# Patient Record
Sex: FEMALE | Race: WHITE | NOT HISPANIC OR LATINO | Employment: PART TIME | URBAN - METROPOLITAN AREA
[De-identification: names, ages, dates, MRNs, and addresses within clinical notes are randomized per-mention and may not be internally consistent; named-entity substitution may affect disease eponyms.]

---

## 2023-09-18 NOTE — PROGRESS NOTES
"Subjective   Patient ID: Alta Albarran is a 38 y.o. female who presents for Establish Care.    HPI  Presents today for above reason  Last PCP: Dr Maria, PCP always was cancelling appts pt would make  How did pt find us: google  Last eye exam: unknown  Last dental: 6 months  Last PAP: April 2023 Abnormal cells were found. Was told \"Not to worry about it  Not eating healthy. Including: chicken, fish, fruit, vegetables  No exercise  Sleeping well  Working as:      Pt would like to discuss medication for anxiety  Pt states she was placed on Prozac by GYN for rage during periods.  Pt was using PRN  Would like to discuss taking this daily.  Pt states medication works well  \"I feel human when I take it\"    Pt has chronic RIGHT shoulder pain x 6 years and would like to discuss    Also feeling fatigued more  Ongoing x 7 years    Yearly BW ordered  Flu vaccine declined      Review of Systems  Constitutional:  no chills, no fever and no night sweats.  Eyes: no blurred vision and no eyesight problems.  ENT: no hearing loss, no nasal congestion, no hoarseness and no sore throat.  Neck: no mass (es) and no swelling.  Cardiovascular: no chest pain, no intermittent leg claudication, no lower extremity edema, no palpitation and no syncope.  Respiratory: no cough, no shortness of breath during exertion, no shortness of breath at rest and no wheezing.  Gastrointestinal: no abdominal pain, no blood in stools, no constipation, no diarrhea, no melena, no nausea, no rectal pain and no vomiting.  Genitourinary: no dysuria, no change in urinary frequency, no urinary hesitancy and no feelings of urinary urgency.  Musculoskeletal: no arthralgias, no back pain and no myalgias.  Integumentary: no new skin lesions and no rashes.  Neurological: no difficulty walking, no headache, no limb weakness, no numbness and no tingling.  Psychiatric/Behavioral: no anxiety, no depression, no anhedonia and no substance use " "disorders.  Endocrine: no recent weight gain and no recent weight loss.  Hematologic/Lymphatic: no tendency for easy bruising and no swollen glands    Objective   Physical Exam  Patient to establish care history of asthma diagnosed 2 years ago as needed inhaler use she is got chronic right shoulder pain injured it 5 or 6 years ago playing on the playground with her son felt a pop had immediate pain rested it did not help follow orthopedics they referred her for therapy did that did not help MRI was read as normal I basically told her they could not do anything for its been progressively worsening she continues to do the therapy they taught her and it does not help minimal relief with Advil Tylenol had a steroid that was given for neck pain did not help the shoulder at all.  Exam shows crepitus decreased range of motion elevation and rotation some degree of adhesive capsulitis she has significant pain with elevating the shoulder and elbow and posterior movement.  Physical exam today's office visit constitutional alert and oriented x3.    Head is atraumatic HEENT is within normal limits.    Neck supple no masses full range of motion.    Thyroid is normal in size no thyromegaly there is no carotid bruits.    Pulmonary exam shows clear to auscultation no respiratory distress.    Cardiovascular shows no murmur rub or gallop.  Regular rate and rhythm.    Abdominal exam soft nontender no hepatosplenomegaly or masses normal bowel sounds no rebound no guarding.    Musculoskeletal exam no joint pain no muscle pain full range of motion.    Psych exam normal mood and affect.    Dermatologic exam no skin lesions no rash no blemishes.    Neuro exam is no focal deficits.  Normal exam.    Extremities no edema normal pulses normal capillary refill.    /68   Pulse 71   Temp 36.6 °C (97.9 °F)   Resp 16   Ht 1.619 m (5' 3.75\")   Wt 60.8 kg (134 lb)   SpO2 99%   BMI 23.18 kg/m²     No results found for: \"WBC\", \"HGB\", \"HCT\", " "\"MCV\", \"PLT\"    Assessment/Plan initial MRI done without contrast we need to get a with contrast MRI nothing else is helping helping.  We will get blood drawn she has been taking as needed Prozac for PMS symptoms will go to daily Prozac as she is having more anxiety.  Follow-up in 1 month.  As soon as we have the blood drawn returns we will also call her.  Problem List Items Addressed This Visit    None  Visit Diagnoses       Malaise and fatigue        Asthma, unspecified asthma severity, unspecified whether complicated, unspecified whether persistent              "

## 2023-09-19 ENCOUNTER — OFFICE VISIT (OUTPATIENT)
Dept: PRIMARY CARE | Facility: CLINIC | Age: 38
End: 2023-09-19
Payer: COMMERCIAL

## 2023-09-19 VITALS
WEIGHT: 134 LBS | HEART RATE: 71 BPM | RESPIRATION RATE: 16 BRPM | DIASTOLIC BLOOD PRESSURE: 68 MMHG | BODY MASS INDEX: 22.88 KG/M2 | OXYGEN SATURATION: 99 % | SYSTOLIC BLOOD PRESSURE: 104 MMHG | TEMPERATURE: 97.9 F | HEIGHT: 64 IN

## 2023-09-19 DIAGNOSIS — G89.29 CHRONIC RIGHT SHOULDER PAIN: ICD-10-CM

## 2023-09-19 DIAGNOSIS — R53.81 MALAISE AND FATIGUE: ICD-10-CM

## 2023-09-19 DIAGNOSIS — F41.9 ANXIETY: Primary | ICD-10-CM

## 2023-09-19 DIAGNOSIS — R53.83 MALAISE AND FATIGUE: ICD-10-CM

## 2023-09-19 DIAGNOSIS — J45.909 ASTHMA, UNSPECIFIED ASTHMA SEVERITY, UNSPECIFIED WHETHER COMPLICATED, UNSPECIFIED WHETHER PERSISTENT (HHS-HCC): ICD-10-CM

## 2023-09-19 DIAGNOSIS — M25.511 CHRONIC RIGHT SHOULDER PAIN: ICD-10-CM

## 2023-09-19 PROCEDURE — 1036F TOBACCO NON-USER: CPT | Performed by: FAMILY MEDICINE

## 2023-09-19 PROCEDURE — 99204 OFFICE O/P NEW MOD 45 MIN: CPT | Performed by: FAMILY MEDICINE

## 2023-09-19 RX ORDER — FLUOXETINE HYDROCHLORIDE 20 MG/1
20 CAPSULE ORAL DAILY PRN
Qty: 90 CAPSULE | Refills: 1 | Status: SHIPPED | OUTPATIENT
Start: 2023-09-19 | End: 2024-03-29

## 2023-09-19 RX ORDER — FLUTICASONE PROPIONATE 50 MCG
2 SPRAY, SUSPENSION (ML) NASAL DAILY
COMMUNITY
Start: 2022-05-06

## 2023-09-19 RX ORDER — FLUOXETINE HYDROCHLORIDE 20 MG/1
20 CAPSULE ORAL DAILY PRN
COMMUNITY
Start: 2023-04-27 | End: 2023-09-19 | Stop reason: SDUPTHER

## 2023-09-19 RX ORDER — ALBUTEROL SULFATE 90 UG/1
2 AEROSOL, METERED RESPIRATORY (INHALATION) EVERY 6 HOURS PRN
COMMUNITY
End: 2023-09-19 | Stop reason: SDUPTHER

## 2023-09-19 RX ORDER — ALBUTEROL SULFATE 90 UG/1
2 AEROSOL, METERED RESPIRATORY (INHALATION) EVERY 6 HOURS PRN
Qty: 8.5 G | Refills: 2 | Status: SHIPPED | OUTPATIENT
Start: 2023-09-19

## 2023-09-19 RX ORDER — CEPHALEXIN 250 MG/1
1 CAPSULE ORAL
COMMUNITY
Start: 2023-09-01

## 2023-09-19 RX ORDER — ALBUTEROL SULFATE 90 UG/1
2 AEROSOL, METERED RESPIRATORY (INHALATION) EVERY 6 HOURS PRN
Qty: 18 G | Refills: 2 | Status: SHIPPED | OUTPATIENT
Start: 2023-09-19 | End: 2023-09-19 | Stop reason: SDUPTHER

## 2023-09-19 NOTE — TELEPHONE ENCOUNTER
Gloria from [facility] in [place] is calling in regards to the rx that was sent over for Albuterol 90 mcg. It was sent over as a quantity of 18g but her insurance doesn't cover it that way. It covers it as 8.5g so she wanted to change it  Ok for change?  Please advise      Thanks    [facility] [place] # [phone]

## 2023-10-14 ENCOUNTER — LAB (OUTPATIENT)
Dept: LAB | Facility: LAB | Age: 38
End: 2023-10-14
Payer: COMMERCIAL

## 2023-10-14 DIAGNOSIS — R53.83 MALAISE AND FATIGUE: ICD-10-CM

## 2023-10-14 DIAGNOSIS — R53.81 MALAISE AND FATIGUE: ICD-10-CM

## 2023-10-14 LAB
ALBUMIN SERPL BCP-MCNC: 4.6 G/DL (ref 3.4–5)
ALP SERPL-CCNC: 62 U/L (ref 33–110)
ALT SERPL W P-5'-P-CCNC: 12 U/L (ref 7–45)
ANION GAP SERPL CALC-SCNC: 12 MMOL/L (ref 10–20)
AST SERPL W P-5'-P-CCNC: 16 U/L (ref 9–39)
BASOPHILS # BLD AUTO: 0.01 X10*3/UL (ref 0–0.1)
BASOPHILS NFR BLD AUTO: 0.1 %
BILIRUB SERPL-MCNC: 0.5 MG/DL (ref 0–1.2)
BUN SERPL-MCNC: 13 MG/DL (ref 6–23)
CALCIUM SERPL-MCNC: 9.7 MG/DL (ref 8.6–10.3)
CHLORIDE SERPL-SCNC: 102 MMOL/L (ref 98–107)
CHOLEST SERPL-MCNC: 180 MG/DL (ref 0–199)
CHOLESTEROL/HDL RATIO: 3.2
CO2 SERPL-SCNC: 28 MMOL/L (ref 21–32)
CREAT SERPL-MCNC: 0.72 MG/DL (ref 0.5–1.05)
EOSINOPHIL # BLD AUTO: 0.01 X10*3/UL (ref 0–0.7)
EOSINOPHIL NFR BLD AUTO: 0.1 %
ERYTHROCYTE [DISTWIDTH] IN BLOOD BY AUTOMATED COUNT: 12.5 % (ref 11.5–14.5)
GFR SERPL CREATININE-BSD FRML MDRD: >90 ML/MIN/1.73M*2
GLUCOSE SERPL-MCNC: 85 MG/DL (ref 74–99)
HCT VFR BLD AUTO: 41.4 % (ref 36–46)
HDLC SERPL-MCNC: 56.4 MG/DL
HGB BLD-MCNC: 13.6 G/DL (ref 12–16)
IMM GRANULOCYTES # BLD AUTO: 0.01 X10*3/UL (ref 0–0.7)
IMM GRANULOCYTES NFR BLD AUTO: 0.1 % (ref 0–0.9)
LDLC SERPL CALC-MCNC: 105 MG/DL
LYMPHOCYTES # BLD AUTO: 1.89 X10*3/UL (ref 1.2–4.8)
LYMPHOCYTES NFR BLD AUTO: 26.7 %
MCH RBC QN AUTO: 27.9 PG (ref 26–34)
MCHC RBC AUTO-ENTMCNC: 32.9 G/DL (ref 32–36)
MCV RBC AUTO: 85 FL (ref 80–100)
MONOCYTES # BLD AUTO: 0.42 X10*3/UL (ref 0.1–1)
MONOCYTES NFR BLD AUTO: 5.9 %
NEUTROPHILS # BLD AUTO: 4.74 X10*3/UL (ref 1.2–7.7)
NEUTROPHILS NFR BLD AUTO: 67.1 %
NON HDL CHOLESTEROL: 124 MG/DL (ref 0–149)
NRBC BLD-RTO: 0 /100 WBCS (ref 0–0)
PLATELET # BLD AUTO: 194 X10*3/UL (ref 150–450)
PMV BLD AUTO: 10.1 FL (ref 7.5–11.5)
POTASSIUM SERPL-SCNC: 4 MMOL/L (ref 3.5–5.3)
PROT SERPL-MCNC: 7.2 G/DL (ref 6.4–8.2)
RBC # BLD AUTO: 4.87 X10*6/UL (ref 4–5.2)
SODIUM SERPL-SCNC: 138 MMOL/L (ref 136–145)
T4 FREE SERPL-MCNC: 0.81 NG/DL (ref 0.61–1.12)
TRIGL SERPL-MCNC: 95 MG/DL (ref 0–149)
VLDL: 19 MG/DL (ref 0–40)
WBC # BLD AUTO: 7.1 X10*3/UL (ref 4.4–11.3)

## 2023-10-14 PROCEDURE — 85025 COMPLETE CBC W/AUTO DIFF WBC: CPT

## 2023-10-14 PROCEDURE — 84439 ASSAY OF FREE THYROXINE: CPT

## 2023-10-14 PROCEDURE — 80053 COMPREHEN METABOLIC PANEL: CPT

## 2023-10-14 PROCEDURE — 36415 COLL VENOUS BLD VENIPUNCTURE: CPT

## 2023-10-14 PROCEDURE — 80061 LIPID PANEL: CPT

## 2023-10-20 ENCOUNTER — TELEPHONE (OUTPATIENT)
Dept: PRIMARY CARE | Facility: CLINIC | Age: 38
End: 2023-10-20
Payer: COMMERCIAL

## 2023-10-20 DIAGNOSIS — F41.9 ANXIETY: Primary | ICD-10-CM

## 2023-10-20 RX ORDER — DIAZEPAM 5 MG/1
TABLET ORAL
Qty: 2 TABLET | Refills: 0 | Status: SHIPPED | OUTPATIENT
Start: 2023-10-20

## 2023-10-20 NOTE — TELEPHONE ENCOUNTER
Pt calling, patient is having an MRI with contrast on her shoulder. She is having this done at [facility]. She wanted to know if you could prescribe valium to take the edge off of her anxiety during the test?    Please advise    Pharmacy: [facility]  Last office visit 9/19/23

## 2023-10-26 ENCOUNTER — TELEPHONE (OUTPATIENT)
Dept: PRIMARY CARE | Facility: CLINIC | Age: 38
End: 2023-10-26
Payer: COMMERCIAL

## 2023-10-26 NOTE — TELEPHONE ENCOUNTER
Pt calling, she states that her Mri was not covered. [facility] told her they were waiting on something from us. Lm with  trc    [place] # [phone]

## 2023-10-26 NOTE — TELEPHONE ENCOUNTER
Fax came over for a peer to peer request with the patients insurance. MRI was denied    [employer] # [phone]    Coverage has been denied for the following reasons:    Records do not show x-rays taken after your symptoms started or changed that support further imaging.     A study similar to the one requested has recently been performed. The results of that study showed your Doctor what they needed to see in order to treat your condition. No additional imaging is necessary at this time.     Imaging requires six weeks of provider treatment to be completed. This must have been completed in the past three three months without improved symptoms.

## 2023-10-31 NOTE — TELEPHONE ENCOUNTER
Spoke with the patient she states that she already did the PT. She wanted to know if there was anything else that could be done to fight this process? She has never had the MRI with contrast to see what is causing her pain.

## 2023-11-02 NOTE — TELEPHONE ENCOUNTER
LM on the patients voicemail. The patient would be better off seeing an orthopedic doctor. They would have a better chance at getting any tests approved. Please refer to orthopedics if patient is agreeable.

## 2023-11-09 ENCOUNTER — TELEPHONE (OUTPATIENT)
Dept: PRIMARY CARE | Facility: CLINIC | Age: 38
End: 2023-11-09
Payer: COMMERCIAL

## 2023-11-09 NOTE — TELEPHONE ENCOUNTER
Patient called into office about the denial for her MRI Shoulder. It has been denied but was denied for MRI Shoulder w/o contrast and she is needing an order for MRI Shoulder W/contrast.    Original MRI was also denied because no updated x-ray has been completed. Patient may need to get an x-ray prior to MRI Shoulder w/contrast to get approved.    New authorization has been started via [employer] and submitted to the insurance. Waiting on approval or denial for the MRI w/contrast.

## 2023-11-14 NOTE — TELEPHONE ENCOUNTER
MRI with Contrast has been denied.     Peer to Peer is scheduled for 11/15/2023 @ 7:45 am with Dr. Garcia with [facility]    Case Number: [id number]  Appointment Date: 11/15/2023  Appointment Time: 07:45 AM /Eastern

## 2023-11-15 NOTE — TELEPHONE ENCOUNTER
DR. COLLADO SPOKE TO DR. RICH WITH [employer] AND MRI WITH CONTRAST HAS BEEN APPROVED.    APPROVAL # [id number] VALID FROM 11/15/2023 TO 05/13/2024    CALL AND LMOM FOR PATIENT TO LET HER KNOW THAT MRI W/CONTRAST HAS BEEN APPROVED AND IF SHE WOULD LIKE US TO SCHEDULE.    IF PATIENT WANTS TO SCHEDULE PLEASE GIVE HER THE APPROVAL # WITH VALID DATES.

## 2023-12-15 ENCOUNTER — TELEPHONE (OUTPATIENT)
Dept: PRIMARY CARE | Facility: CLINIC | Age: 38
End: 2023-12-15
Payer: COMMERCIAL

## 2023-12-15 DIAGNOSIS — G89.29 CHRONIC RIGHT SHOULDER PAIN: ICD-10-CM

## 2023-12-15 DIAGNOSIS — M25.511 CHRONIC RIGHT SHOULDER PAIN: ICD-10-CM

## 2023-12-15 NOTE — TELEPHONE ENCOUNTER
----- Message from Mk Rodríguez MD sent at 12/15/2023 11:20 AM EST -----  MRI does not show any tears.  Refer to orthopedics or if already seen by Ortho pain management

## 2023-12-18 NOTE — TELEPHONE ENCOUNTER
Lmom for patient with scheduling info for ortho referral and to call to set up her own appt.  [phone]

## 2024-03-19 ENCOUNTER — TELEPHONE (OUTPATIENT)
Dept: PRIMARY CARE | Facility: CLINIC | Age: 39
End: 2024-03-19
Payer: COMMERCIAL

## 2024-03-19 NOTE — TELEPHONE ENCOUNTER
Patient is calling - she is asking for a letter from us, for her massages that she gets, needs to say that she gets them for medical purposes for her chronic neck & shoulder pain.  Ok for letter?    [phone]

## 2024-03-19 NOTE — LETTER
March 19, 2024     Patient: Alta Albarran   YOB: 1985     To Whom It May Concern:    Alta Albarran is a current patient of mine. It is medically necessary that she get massages for her chronic neck and shoulder pain.     If you have any questions or concerns, please don't hesitate to call.         Sincerely,         Mk Rodríguez MD

## 2024-03-28 DIAGNOSIS — F41.9 ANXIETY: ICD-10-CM

## 2024-03-29 RX ORDER — FLUOXETINE HYDROCHLORIDE 20 MG/1
20 CAPSULE ORAL DAILY PRN
Qty: 30 CAPSULE | Refills: 0 | Status: SHIPPED | OUTPATIENT
Start: 2024-03-29 | End: 2024-05-17 | Stop reason: SDUPTHER

## 2024-05-17 DIAGNOSIS — F41.9 ANXIETY: ICD-10-CM

## 2024-05-17 RX ORDER — FLUOXETINE HYDROCHLORIDE 20 MG/1
20 CAPSULE ORAL DAILY
Qty: 30 CAPSULE | Refills: 0 | Status: SHIPPED | OUTPATIENT
Start: 2024-05-17 | End: 2024-05-28 | Stop reason: SDUPTHER

## 2024-05-17 NOTE — TELEPHONE ENCOUNTER
Rx Refill Request Telephone Encounter    Name:  Alta Albarran  :  [id number]  Medication Name:  FLUoxetine (PROzac) 20 mg capsule     Specific Pharmacy location:  [facility]   Date of last appointment:  2023  Date of next appointment:  2024  Best number to reach patient:  [phone]

## 2024-05-23 NOTE — PROGRESS NOTES
Subjective   Patient ID: Alta Albarran is a 38 y.o. female who presents for Anxiety and Palpitations.  HPI    Patient presents today for a follow-up on Anxiety. Is taking Prozac 20 MG. States she has no issues with this medication. Patient states she is doing well on this medication.     Patient states she has been having frequent heart palpitations daily. Patient states this has been going on for a long time. Patient states she has seen a cardiologist in the past and he was not concerned about this. Patient states she does not have chest pain or dizziness.    Patient would like it noted she is having a hysterectomy at the end of July.    Has no other new problem /question.    Review of Systems  Constitutional:  no chills, no fever and no night sweats.  Eyes: no blurred vision and no eyesight problems.  ENT: no hearing loss, no nasal congestion, no hoarseness and no sore throat.  Neck: no mass (es) and no swelling.  Cardiovascular: no chest pain, no intermittent leg claudication, no lower extremity edema, no palpitation and no syncope.  Respiratory: no cough, no shortness of breath during exertion, no shortness of breath at rest and no wheezing.  Gastrointestinal: no abdominal pain, no blood in stools, no constipation, no diarrhea, no melena, no nausea, no rectal pain and no vomiting.  Genitourinary: no dysuria, no change in urinary frequency, no urinary hesitancy and no feelings of urinary urgency.  Musculoskeletal: no arthralgias, no back pain and no myalgias.  Integumentary: no new skin lesions and no rashes.  Neurological: no difficulty walking, no headache, no limb weakness, no numbness and no tingling.  Psychiatric/Behavioral: no anxiety, no depression, no anhedonia and no substance use disorders.  Endocrine: no recent weight gain and no recent weight loss.  Hematologic/Lymphatic: no tendency for easy bruising and no swollen glands    Objective   Physical Exam  Patient in for complaints of palpitations has  "had them on and off for years but over the past 3 to 4 months it has been happening at least 2-3 times a day every day denies being under increased stress she drinks no caffeinated beverages.  Denies dyspnea on exertion or chest pain.  Denies heart racing.  She has a strong family history of A-fib in mom and grandmother and father side of the family has a significant cardiac history of early onset MI.  Thin female in no acute distress.  EKG normal sinus no acute changes she has no complaints of chest pain or shortness of breath.  No physical complaints today.  /62 (BP Location: Left arm, Patient Position: Sitting)   Pulse 65   Temp 36.2 °C (97.1 °F) (Temporal)   Ht 1.619 m (5' 3.75\")   Wt 58.3 kg (128 lb 9.6 oz)   SpO2 100%   BMI 22.25 kg/m²     Lab Results   Component Value Date    WBC 7.1 10/14/2023    HGB 13.6 10/14/2023    HCT 41.4 10/14/2023    MCV 85 10/14/2023     10/14/2023       Assessment/Plan given her family history and her concern we will refer her to cardiology for evaluation as a precaution.  Will get thyroid levels checked also.  Having a hysterectomy later in the summer.  Follow-up when we get the blood test results.  Await cardiology's evaluation and recommendation.  Problem List Items Addressed This Visit    None  Visit Diagnoses       Anxiety    -  Primary    Asthma, unspecified asthma severity, unspecified whether complicated, unspecified whether persistent ([facility])        BMI 22.0-22.9, adult        Heart palpitations        Relevant Orders    ECG 12 lead (Clinic Performed)            "

## 2024-05-28 ENCOUNTER — OFFICE VISIT (OUTPATIENT)
Dept: PRIMARY CARE | Facility: CLINIC | Age: 39
End: 2024-05-28
Payer: COMMERCIAL

## 2024-05-28 VITALS
DIASTOLIC BLOOD PRESSURE: 62 MMHG | HEART RATE: 65 BPM | HEIGHT: 64 IN | WEIGHT: 128.6 LBS | OXYGEN SATURATION: 100 % | BODY MASS INDEX: 21.95 KG/M2 | SYSTOLIC BLOOD PRESSURE: 102 MMHG | TEMPERATURE: 97.1 F

## 2024-05-28 DIAGNOSIS — J45.909 ASTHMA, UNSPECIFIED ASTHMA SEVERITY, UNSPECIFIED WHETHER COMPLICATED, UNSPECIFIED WHETHER PERSISTENT (HHS-HCC): ICD-10-CM

## 2024-05-28 DIAGNOSIS — F41.9 ANXIETY: Primary | ICD-10-CM

## 2024-05-28 DIAGNOSIS — R00.2 HEART PALPITATIONS: ICD-10-CM

## 2024-05-28 PROCEDURE — 99213 OFFICE O/P EST LOW 20 MIN: CPT | Performed by: FAMILY MEDICINE

## 2024-05-28 PROCEDURE — 1036F TOBACCO NON-USER: CPT | Performed by: FAMILY MEDICINE

## 2024-05-28 PROCEDURE — 93000 ELECTROCARDIOGRAM COMPLETE: CPT | Performed by: FAMILY MEDICINE

## 2024-05-28 PROCEDURE — 3008F BODY MASS INDEX DOCD: CPT | Performed by: FAMILY MEDICINE

## 2024-05-28 RX ORDER — FLUOXETINE HYDROCHLORIDE 20 MG/1
20 CAPSULE ORAL DAILY
Qty: 90 CAPSULE | Refills: 1 | Status: SHIPPED | OUTPATIENT
Start: 2024-05-28 | End: 2024-08-26

## 2024-05-28 ASSESSMENT — PATIENT HEALTH QUESTIONNAIRE - PHQ9
SUM OF ALL RESPONSES TO PHQ9 QUESTIONS 1 AND 2: 0
2. FEELING DOWN, DEPRESSED OR HOPELESS: NOT AT ALL
1. LITTLE INTEREST OR PLEASURE IN DOING THINGS: NOT AT ALL

## 2024-07-12 ENCOUNTER — APPOINTMENT (OUTPATIENT)
Dept: CARDIOLOGY | Facility: CLINIC | Age: 39
End: 2024-07-12
Payer: COMMERCIAL

## 2024-07-12 VITALS
SYSTOLIC BLOOD PRESSURE: 110 MMHG | BODY MASS INDEX: 23.37 KG/M2 | HEIGHT: 62 IN | HEART RATE: 79 BPM | WEIGHT: 127 LBS | DIASTOLIC BLOOD PRESSURE: 64 MMHG

## 2024-07-12 DIAGNOSIS — Z01.818 PRE-OP EXAMINATION: ICD-10-CM

## 2024-07-12 DIAGNOSIS — R00.2 HEART PALPITATIONS: ICD-10-CM

## 2024-07-12 DIAGNOSIS — R94.31 ABNORMAL EKG: ICD-10-CM

## 2024-07-12 DIAGNOSIS — R07.89 CHEST PRESSURE: ICD-10-CM

## 2024-07-12 PROCEDURE — 1036F TOBACCO NON-USER: CPT | Performed by: INTERNAL MEDICINE

## 2024-07-12 PROCEDURE — 3008F BODY MASS INDEX DOCD: CPT | Performed by: INTERNAL MEDICINE

## 2024-07-12 PROCEDURE — 99204 OFFICE O/P NEW MOD 45 MIN: CPT | Performed by: INTERNAL MEDICINE

## 2024-07-12 NOTE — PROGRESS NOTES
CARDIOLOGY NEW PATIENT OFFICE VISIT    Patient:  Alta Albarran  YOB: 1985  MRN: [id number]       Chief Complaint:      Chief Complaint   Patient presents with    New Patient Visit       History of Present Illness:     Alta Albarran is a 39 y.o. female who is being seen today at the request of Dr. Mk Velasquez for evaluation of palpitations.  She does not have any history of atherosclerotic heart or valvular heart disease.  She has a history of asthma for which she uses inhalers.  She has a history of anxiety for which she takes Prozac 20 mg daily.  She works full-time as a .  She notes longstanding history of palpitations.  She notes the symptoms have been occurring more frequently over the past few months.  She describes varying types of palpitations including heart, brief pressure-like discomfort in the chest, or brief flutters.  She sometimes feels a hard pinching like sensation followed by some coughing.  She states the symptoms can occur at rest or with exertional activity.  She is not able to relate any exacerbating or relieving factors.  She drinks minimal if any caffeine.  She does not drink any alcohol.  She works as a .  She generally is very active without significant limitations.   She reports a strong family history of atrial fibrillation including her mother and maternal grandmother.  She is scheduled to undergo a hysterectomy in 2 1/2 weeks.     Her EKG in the office today shows normal sinus rhythm, poor R wave progression, and inferior nonspecific ST-T wave changes.  Lab studies dated July 9, 2024 showed a normal CBC and CMP.  TSH and free T4 were normal.  Lipid profile October 14, 2023 showed a total cholesterol 180 with HDL 56, , and triglycerides 95.  Other details as noted below.      Allergies:     Allergies   Allergen Reactions    Cephalosporins Other        Outpatient Medications:     Current Outpatient Medications   Medication Instructions     Advair Diskus 100-50 mcg/dose diskus inhaler 1 puff, inhalation    albuterol 90 mcg/actuation inhaler 2 puffs, inhalation, Every 6 hours PRN    diazePAM (Valium) 5 mg tablet Take 1 tablet 2 hours prior to MRI.  Repeat 30 minutes before MRI if needed    FLUoxetine (PROZAC) 20 mg, oral, Daily    fluticasone (Flonase) 50 mcg/actuation nasal spray 2 sprays, nasal, Daily        Past Medical History:     Past Medical History:   Diagnosis Date    Asthma ([facility])     Personal history of other diseases of the digestive system     History of inflammatory bowel disease       Social History:     Social History     Tobacco Use    Smoking status: Never    Smokeless tobacco: Never   Vaping Use    Vaping status: Never Used   Substance Use Topics    Alcohol use: Never    Drug use: Never       Family History:     Family History   Problem Relation Name Age of Onset    Other (htn) Mother      Hyperlipidemia Mother      Other (htn) Father      Hyperlipidemia Father         Review of Systems:     12 point review of systems completed and is negative other than that detailed above.       Objective:     Vitals:    07/12/24 1501   BP: 110/64   Pulse: 79       Wt Readings from Last 4 Encounters:   07/12/24 57.6 kg (127 lb)   05/28/24 58.3 kg (128 lb 9.6 oz)   09/19/23 60.8 kg (134 lb)   05/06/22 56.2 kg (124 lb)       Physical Examination:   GENERAL:  Well developed, well nourished, in no acute distress.  CHEST:  Symmetric and nontender.  NEURO/PSYCH:  Alert and oriented times three with approppriate behavior and responses.  NECK:  Supple, no JVD, no bruit.  LUNGS:  Clear to auscultation bilaterally, normal respiratory effort.  HEART:  Rate and rhythm regular with no evident murmur, no gallop appreciated.        There are no rubs, clicks or heaves.  EXTREMITIES:  Warm with good color, no clubbing or cyanosis.  There is no edema noted.  PERIPHERAL VASCULAR:  Pulses present and equally palpable; 2+ throughout.      Lab:     CBC:   Lab Results    Component Value Date    WBC 7.1 10/14/2023    RBC 4.87 10/14/2023    HGB 13.6 10/14/2023    HCT 41.4 10/14/2023     10/14/2023        CMP:    Lab Results   Component Value Date     10/14/2023    K 4.0 10/14/2023     10/14/2023    CO2 28 10/14/2023    BUN 13 10/14/2023    CREATININE 0.72 10/14/2023    GLUCOSE 85 10/14/2023    CALCIUM 9.7 10/14/2023       Lipid Profile:    Lab Results   Component Value Date    TRIG 95 10/14/2023    HDL 56.4 10/14/2023    LDLCALC 105 (H) 10/14/2023       BMP:  Lab Results   Component Value Date     10/14/2023    K 4.0 10/14/2023     10/14/2023    CO2 28 10/14/2023    BUN 13 10/14/2023    CREATININE 0.72 10/14/2023       CBC:  Lab Results   Component Value Date    WBC 7.1 10/14/2023    RBC 4.87 10/14/2023    HGB 13.6 10/14/2023    HCT 41.4 10/14/2023    MCV 85 10/14/2023    MCH 27.9 10/14/2023    MCHC 32.9 10/14/2023    RDW 12.5 10/14/2023     10/14/2023    MPV 10.1 10/14/2023       Hepatic Function Panel:    Lab Results   Component Value Date    ALKPHOS 62 10/14/2023    ALT 12 10/14/2023    AST 16 10/14/2023    PROT 7.2 10/14/2023    BILITOT 0.5 10/14/2023       Assessment:     Problem List Items Addressed This Visit             ICD-10-CM    Heart palpitations R00.2    Relevant Orders    Follow Up In Cardiology    Holter Or Event Cardiac Monitor    Echocardiogram Stress Test    Chest pressure R07.89    Relevant Orders    Follow Up In Cardiology    Holter Or Event Cardiac Monitor    Echocardiogram Stress Test    Pre-op examination Z01.818    Relevant Orders    Follow Up In Cardiology    Holter Or Event Cardiac Monitor    Echocardiogram Stress Test    Abnormal EKG R94.31    Relevant Orders    Follow Up In Cardiology    Holter Or Event Cardiac Monitor    Echocardiogram Stress Test       Plan:     In light of her palpitations she will be scheduled for a 7-day Zio patch monitor in an effort to better correlate her symptoms with significant  dysrhythmia.    Her resting EKG is abnormal and shows poor R wave progression.  Cannot exclude a septal infarct.  There are some inferior nonspecific ST-T wave changes.  She is scheduled to undergo a hysterectomy in 2 and half weeks.  In light of her symptoms of chest discomfort she will be scheduled for a stress echocardiogram in the near future.  Further recommendations pending these results.

## 2024-07-12 NOTE — PATIENT INSTRUCTIONS
Continue same medications and treatments.     Please bring any lab results from other providers / physicians to your next appointment.     Please bring all medicines, vitamins, and herbal supplements with you when you come to the office.     Prescriptions will not be filled unless you are compliant with your follow up appointments or have a follow up appointment scheduled as per instruction of your physician. Refills should be requested at the time of your visit.

## 2024-07-15 ENCOUNTER — TELEPHONE (OUTPATIENT)
Dept: CARDIOLOGY | Facility: CLINIC | Age: 39
End: 2024-07-15

## 2024-07-15 ENCOUNTER — APPOINTMENT (OUTPATIENT)
Dept: CARDIOLOGY | Facility: CLINIC | Age: 39
End: 2024-07-15
Payer: COMMERCIAL

## 2024-07-15 DIAGNOSIS — Z01.818 PRE-OP EXAMINATION: ICD-10-CM

## 2024-07-15 DIAGNOSIS — R94.31 ABNORMAL EKG: ICD-10-CM

## 2024-07-15 DIAGNOSIS — R07.89 CHEST PRESSURE: ICD-10-CM

## 2024-07-15 DIAGNOSIS — R00.2 HEART PALPITATIONS: ICD-10-CM

## 2024-07-15 PROCEDURE — 93272 ECG/REVIEW INTERPRET ONLY: CPT | Performed by: INTERNAL MEDICINE

## 2024-07-15 NOTE — TELEPHONE ENCOUNTER
7 day Faye [id number] no prior auth required per the [employer] Transaction ID: [id number].

## 2024-07-16 ENCOUNTER — TELEPHONE (OUTPATIENT)
Dept: CARDIOLOGY | Facility: CLINIC | Age: 39
End: 2024-07-16
Payer: COMMERCIAL

## 2024-07-16 NOTE — TELEPHONE ENCOUNTER
Armand nurse at [facility] Pre Admission Testing  #[phone] left [facility] stating pt is was seen by Dr. Watts last week and is scheduled for hysterectomy on 7/30/24 and need any testing that has been done recently and faxed to #[phone].    Routed to Medical records to fax over requested info including last office note from Dr. Watts, most recent EKG and any recent testing.

## 2024-07-18 ENCOUNTER — TELEPHONE (OUTPATIENT)
Dept: CARDIOLOGY | Facility: CLINIC | Age: 39
End: 2024-07-18
Payer: COMMERCIAL

## 2024-07-18 DIAGNOSIS — R00.2 HEART PALPITATIONS: ICD-10-CM

## 2024-07-18 DIAGNOSIS — R07.89 CHEST PRESSURE: ICD-10-CM

## 2024-07-18 DIAGNOSIS — R94.31 ABNORMAL EKG: ICD-10-CM

## 2024-07-18 DIAGNOSIS — Z01.818 PRE-OP EXAMINATION: ICD-10-CM

## 2024-07-18 NOTE — TELEPHONE ENCOUNTER
Stress Echo is pending a medical review with [employer]. [employer] requested clinical information and that has been sent.   Will continue to follow for any updates.

## 2024-07-18 NOTE — TELEPHONE ENCOUNTER
Pt called office stating stress echo is denied. Pt is scheduled for 7/24/24.    Pt says that she got a call from her insurance stating it is not covered, but did not say why and was advised a letter will be mailed.     Routed to Kiesha Basurto for follow up. Pt requesting a return call.   Routed to Dr. Watts for any new orders or recommendations.

## 2024-07-19 NOTE — TELEPHONE ENCOUNTER
Called and LVM with patient advising that it was denied as of last night. Will call once we know what the next steps are.

## 2024-07-19 NOTE — TELEPHONE ENCOUNTER
Received another update from pre-cert this morning.    [employer] has denied coverage for the stress echo, but no denial letter has been uploaded yet.

## 2024-07-19 NOTE — TELEPHONE ENCOUNTER
Called to Armand, Nurse at [facility] PAT and advised records have been faxed. Armand states records rec'd and waiting further testing to be done and will wait results and fax.

## 2024-07-23 NOTE — TELEPHONE ENCOUNTER
Advised patient of message and verbalized understanding. Order placed pending authorization. Patient to have stress test completed tomorrow morning.  Erin Da Silva MA

## 2024-07-24 ENCOUNTER — CLINICAL SUPPORT (OUTPATIENT)
Dept: CARDIOLOGY | Facility: HOSPITAL | Age: 39
End: 2024-07-24
Payer: COMMERCIAL

## 2024-07-24 ENCOUNTER — APPOINTMENT (OUTPATIENT)
Dept: CARDIOLOGY | Facility: HOSPITAL | Age: 39
End: 2024-07-24
Payer: COMMERCIAL

## 2024-07-24 DIAGNOSIS — Z01.818 PRE-OP EXAMINATION: ICD-10-CM

## 2024-07-24 DIAGNOSIS — R00.2 HEART PALPITATIONS: ICD-10-CM

## 2024-07-24 DIAGNOSIS — R07.89 CHEST PRESSURE: ICD-10-CM

## 2024-07-24 DIAGNOSIS — R94.31 ABNORMAL EKG: ICD-10-CM

## 2024-07-24 PROCEDURE — 93017 CV STRESS TEST TRACING ONLY: CPT

## 2024-07-24 PROCEDURE — 93016 CV STRESS TEST SUPVJ ONLY: CPT | Performed by: INTERNAL MEDICINE

## 2024-07-24 PROCEDURE — 93018 CV STRESS TEST I&R ONLY: CPT | Performed by: INTERNAL MEDICINE

## 2024-08-23 ENCOUNTER — APPOINTMENT (OUTPATIENT)
Dept: CARDIOLOGY | Facility: CLINIC | Age: 39
End: 2024-08-23
Payer: COMMERCIAL

## 2024-08-23 VITALS
BODY MASS INDEX: 23.45 KG/M2 | DIASTOLIC BLOOD PRESSURE: 60 MMHG | HEIGHT: 62 IN | WEIGHT: 127.4 LBS | HEART RATE: 83 BPM | SYSTOLIC BLOOD PRESSURE: 100 MMHG

## 2024-08-23 DIAGNOSIS — Z01.818 PRE-OP EXAMINATION: ICD-10-CM

## 2024-08-23 DIAGNOSIS — R00.2 HEART PALPITATIONS: ICD-10-CM

## 2024-08-23 DIAGNOSIS — R94.31 ABNORMAL EKG: ICD-10-CM

## 2024-08-23 DIAGNOSIS — R07.89 CHEST PRESSURE: ICD-10-CM

## 2024-08-23 PROCEDURE — 3008F BODY MASS INDEX DOCD: CPT | Performed by: INTERNAL MEDICINE

## 2024-08-23 PROCEDURE — 99213 OFFICE O/P EST LOW 20 MIN: CPT | Performed by: INTERNAL MEDICINE

## 2024-08-23 PROCEDURE — 1036F TOBACCO NON-USER: CPT | Performed by: INTERNAL MEDICINE

## 2024-08-23 NOTE — PROGRESS NOTES
Patient:  Alta Albarran  YOB: 1985  MRN: [id number]       HPI:       Alta Albarran is a 39 y.o. female who returns today for cardiac follow-up.  She was seen July 12, 2024 for evaluation of palpitations.  She does not have any history of atherosclerotic heart or valvular heart disease.  She has a history of asthma for which she uses inhalers.  She has a history of anxiety for which she takes Prozac 20 mg daily.  She works full-time as a .  She notes a longstanding history of palpitations.  She notes the symptoms have been occurring more frequently over the past few months.  She describes varying types of palpitations including hard, brief pressure-like discomfort in the chest and brief flutters.  She sometimes feels a hard pinching like sensation followed by some coughing.  She states the symptoms can occur at rest or with exertional activity.  She is not able to relate any exacerbating or relieving factors.  She drinks minimal if any caffeine.  She does not drink any alcohol.  She generally is very active without significant limitations.   She reports a strong family history of atrial fibrillation including her mother and maternal grandmother.  She is scheduled to undergo a hysterectomy in 2 1/2 weeks.      Her EKG showed normal sinus rhythm, poor R wave progression, and inferior nonspecific ST-T wave changes.  Lab studies dated July 9, 2024 showed a normal CBC and CMP.  TSH and free T4 were normal.  Lipid profile October 14, 2023 showed a total cholesterol 180 with HDL 56, , and triglycerides 95.     An exercise treadmill test on July 24, 2024 was negative for any exercise-induced ST changes.  She described level 1 out of 10 discomfort.  She achieved 99% of MPHR at an energy level of 14.30 METS.  No cardiac dysrhythmias.  Normal blood pressure response.  A 7-day Zio patch monitor July 15, 2024 showed normal sinus rhythm with an average heart rate 76 bpm.  Heart rates  ranged from 45 to 154 bpm.  Rare PACs.  No atrial or ventricular dysrhythmias.    She has been doing well since her last visit.  She is recovering from her hysterectomy.  She plans to return to work in about 3 weeks.  She has rare brief palpitations.  She denies any recent chest pain or shortness breath.  She denies any orthopnea, PND, or increasing peripheral edema.  She denies any lightheadedness, near-syncope, or syncope.  She denies any fever, chills, or cough.  She denies any nausea, vomiting, or diaphoresis.  She denies any hemoptysis, hematemesis, melena, or hematochezia.  I discussed results of testing in detail with her.  Overall low risk findings.  She does not have any anginal or CHF symptoms.  Her blood pressures are well-controlled.  I advised her to consider using a E Ink mobile device or smart watch to better document the actual heart rhythm when she has palpitations.  Other details as noted below.      Objective:     Vitals:    08/23/24 1455   BP: 100/60   Pulse: 83       Wt Readings from Last 4 Encounters:   08/23/24 57.8 kg (127 lb 6.4 oz)   07/12/24 57.6 kg (127 lb)   05/28/24 58.3 kg (128 lb 9.6 oz)   09/19/23 60.8 kg (134 lb)       Allergies:     Allergies   Allergen Reactions    Cephalosporins Other          Medications:     Current Outpatient Medications   Medication Instructions    diazePAM (Valium) 5 mg tablet Take 1 tablet 2 hours prior to MRI.  Repeat 30 minutes before MRI if needed    FLUoxetine (PROZAC) 20 mg, oral, Daily       Physical Examination:   GENERAL:  Well developed, well nourished, in no acute distress.  CHEST:  Symmetric and nontender.  NEURO/PSYCH:  Alert and oriented times three with approppriate behavior and responses.  NECK:  Supple, no JVD, no bruit.  LUNGS:  Clear to auscultation bilaterally, normal respiratory effort.  HEART:  Rate and rhythm regular with no evident murmur, no gallop appreciated.        There are no rubs, clicks or heaves.  EXTREMITIES:  Warm with  good color, no clubbing or cyanosis.  There is no edema noted.  PERIPHERAL VASCULAR:  Pulses present and equally palpable; 2+ throughout.      Lab:     CBC:   Lab Results   Component Value Date    WBC 7.1 10/14/2023    RBC 4.87 10/14/2023    HGB 13.6 10/14/2023    HCT 41.4 10/14/2023     10/14/2023        CMP:    Lab Results   Component Value Date     10/14/2023    K 4.0 10/14/2023     10/14/2023    CO2 28 10/14/2023    BUN 13 10/14/2023    CREATININE 0.72 10/14/2023    GLUCOSE 85 10/14/2023    CALCIUM 9.7 10/14/2023       Lipid Profile:    Lab Results   Component Value Date    TRIG 95 10/14/2023    HDL 56.4 10/14/2023    LDLCALC 105 (H) 10/14/2023       BMP:  Lab Results   Component Value Date     10/14/2023    K 4.0 10/14/2023     10/14/2023    CO2 28 10/14/2023    BUN 13 10/14/2023    CREATININE 0.72 10/14/2023       CBC:  Lab Results   Component Value Date    WBC 7.1 10/14/2023    RBC 4.87 10/14/2023    HGB 13.6 10/14/2023    HCT 41.4 10/14/2023    MCV 85 10/14/2023    MCH 27.9 10/14/2023    MCHC 32.9 10/14/2023    RDW 12.5 10/14/2023     10/14/2023    MPV 10.1 10/14/2023       Hepatic Function Panel:    Lab Results   Component Value Date    ALKPHOS 62 10/14/2023    ALT 12 10/14/2023    AST 16 10/14/2023    PROT 7.2 10/14/2023    BILITOT 0.5 10/14/2023       Problem List:     Patient Active Problem List   Diagnosis    Heart palpitations    Chest pressure    Pre-op examination    Abnormal EKG       Asessment:     Problem List Items Addressed This Visit             ICD-10-CM    Heart palpitations R00.2    Relevant Orders    Follow Up In Cardiology    Chest pressure R07.89    Pre-op examination Z01.818    Abnormal EKG R94.31

## 2024-08-29 ENCOUNTER — OFFICE VISIT (OUTPATIENT)
Dept: PRIMARY CARE | Facility: CLINIC | Age: 39
End: 2024-08-29
Payer: COMMERCIAL

## 2024-08-29 VITALS
TEMPERATURE: 97 F | SYSTOLIC BLOOD PRESSURE: 98 MMHG | HEIGHT: 62 IN | OXYGEN SATURATION: 98 % | BODY MASS INDEX: 23.6 KG/M2 | WEIGHT: 128.22 LBS | DIASTOLIC BLOOD PRESSURE: 62 MMHG | RESPIRATION RATE: 16 BRPM | HEART RATE: 80 BPM

## 2024-08-29 DIAGNOSIS — K21.9 GASTROESOPHAGEAL REFLUX DISEASE, UNSPECIFIED WHETHER ESOPHAGITIS PRESENT: ICD-10-CM

## 2024-08-29 DIAGNOSIS — I80.8 THROMBOPHLEBITIS ARM: Primary | ICD-10-CM

## 2024-08-29 PROBLEM — D68.59 THROMBOPHILIA (MULTI): Status: ACTIVE | Noted: 2024-08-29

## 2024-08-29 PROCEDURE — 3008F BODY MASS INDEX DOCD: CPT | Performed by: FAMILY MEDICINE

## 2024-08-29 PROCEDURE — 99213 OFFICE O/P EST LOW 20 MIN: CPT | Performed by: FAMILY MEDICINE

## 2024-08-29 PROCEDURE — 1036F TOBACCO NON-USER: CPT | Performed by: FAMILY MEDICINE

## 2024-08-29 RX ORDER — OMEPRAZOLE 40 MG/1
40 CAPSULE, DELAYED RELEASE ORAL
Qty: 30 CAPSULE | Refills: 2 | Status: SHIPPED | OUTPATIENT
Start: 2024-08-29

## 2024-08-29 RX ORDER — NABUMETONE 500 MG/1
500 TABLET, FILM COATED ORAL 2 TIMES DAILY
Qty: 60 TABLET | Refills: 1 | Status: SHIPPED | OUTPATIENT
Start: 2024-08-29

## 2024-08-29 ASSESSMENT — PATIENT HEALTH QUESTIONNAIRE - PHQ9
2. FEELING DOWN, DEPRESSED OR HOPELESS: NOT AT ALL
1. LITTLE INTEREST OR PLEASURE IN DOING THINGS: NOT AT ALL
SUM OF ALL RESPONSES TO PHQ9 QUESTIONS 1 AND 2: 0

## 2024-08-29 NOTE — PROGRESS NOTES
"Subjective   Patient ID: Alta Albarran is a 39 y.o. female who presents for thrombophlebitis    HPI    Patient had a hysterectomy on 7/30/24. Patient presents today for blood clots in right arm. Ongoing since 8/5/24. She states that develop another blood clot on Tuesday 8/27/24. Pt states that it hurts to tough the area  where blood clots are located on the right arm. Pt has tried warm compass and massage minimal relief.  History of thrombophlebitis. Patient did follow up with her OB/GYN. Patient admits that it was recommended to see her PCP.     No other concern /question   Review of systems  ; Patient seen today for exam denies any problems with headaches or vision, denies any shortness of breath chest pain nausea or vomiting, no black stool no blood in the stool no heartburn type symptoms denies any problems with constipation or diarrhea, and no dysuria-type symptoms    The patient's allergies medications were reviewed with them today    The patient's social family and surgical history or also reviewed here today, along with her past medical history.     Objective     Alert and active in  no acute distress  Neurological exam unremarkable- DTRs in upper and lower extremities within normal limits.   skin -no lesions noted    Right arm small areas of thrombophlebitis noted neurovascular intact with good pulses all throughout no tenderness no erythema or swelling noted here today thrombo for      BP 98/62 (BP Location: Right arm, Patient Position: Sitting, BP Cuff Size: Adult)   Pulse 80   Temp 36.1 °C (97 °F) (Temporal)   Resp 16   Ht 1.575 m (5' 2\")   Wt 58.2 kg (128 lb 3.5 oz)   SpO2 98%   BMI 23.45 kg/m²     Allergies   Allergen Reactions    Cephalosporins Other       Assessment/Plan   Problem List Items Addressed This Visit          Coag and Thromboembolic    Thrombophilia (Multi)       Endocrine/Metabolic    BMI 23.0-23.9, adult     Other Visit Diagnoses       Gastroesophageal reflux disease, " unspecified whether esophagitis present              Prescribed Omeprazole 40 mg today.     Recommended heat.     Patient prescribed Relafen 500 mg.     If anything worsens or changes please call us at once, follow up in the office as planned,   Scribe Attestation  By signing my name below, I, Funmi Hamilton MA , Scribe   attest that this documentation has been prepared under the direction and in the presence of Homer Wu DO.

## 2024-09-17 ENCOUNTER — APPOINTMENT (OUTPATIENT)
Dept: PRIMARY CARE | Facility: CLINIC | Age: 39
End: 2024-09-17
Payer: COMMERCIAL

## 2024-09-17 VITALS
HEIGHT: 62 IN | HEART RATE: 93 BPM | OXYGEN SATURATION: 97 % | DIASTOLIC BLOOD PRESSURE: 58 MMHG | SYSTOLIC BLOOD PRESSURE: 100 MMHG | BODY MASS INDEX: 23.77 KG/M2 | WEIGHT: 129.2 LBS | RESPIRATION RATE: 16 BRPM | TEMPERATURE: 97.3 F

## 2024-09-17 DIAGNOSIS — F41.9 ANXIETY: ICD-10-CM

## 2024-09-17 DIAGNOSIS — Z00.00 ROUTINE GENERAL MEDICAL EXAMINATION AT A HEALTH CARE FACILITY: ICD-10-CM

## 2024-09-17 DIAGNOSIS — K21.9 GASTROESOPHAGEAL REFLUX DISEASE, UNSPECIFIED WHETHER ESOPHAGITIS PRESENT: ICD-10-CM

## 2024-09-17 PROCEDURE — 3008F BODY MASS INDEX DOCD: CPT | Performed by: FAMILY MEDICINE

## 2024-09-17 PROCEDURE — 99396 PREV VISIT EST AGE 40-64: CPT | Performed by: FAMILY MEDICINE

## 2024-09-17 PROCEDURE — 1036F TOBACCO NON-USER: CPT | Performed by: FAMILY MEDICINE

## 2024-09-17 ASSESSMENT — PATIENT HEALTH QUESTIONNAIRE - PHQ9
1. LITTLE INTEREST OR PLEASURE IN DOING THINGS: NOT AT ALL
SUM OF ALL RESPONSES TO PHQ9 QUESTIONS 1 AND 2: 0
2. FEELING DOWN, DEPRESSED OR HOPELESS: NOT AT ALL

## 2024-09-17 NOTE — PROGRESS NOTES
Subjective   Patient ID: Alta Albarran is a 39 y.o. female who presents for Annual Exam and GERD.  HPI  Annual physical   Eats a generally healthy diet   Staying active    Denies any chest pain,SOB  No Abdominal pain   No black or bloody stools   Urination/BM normal   Last eye apt last yrs   Last dental apt 2 month   BW done 7/9/24    GERD follow up  Taking Omeprazole 40 mg   Working well  Denies any side effects   Not eating 1-2 hours before bed   Does have issues when he eats spicy foods.      Pt refuse flu vacc today     No other concern /question         Review of Systems  Constitutional:  no chills, no fever and no night sweats.  Eyes: no blurred vision and no eyesight problems.  ENT: no hearing loss, no nasal congestion, no hoarseness and no sore throat.  Neck: no mass (es) and no swelling.  Cardiovascular: no chest pain, no intermittent leg claudication, no lower extremity edema, no palpitation and no syncope.  Respiratory: no cough, no shortness of breath during exertion, no shortness of breath at rest and no wheezing.  Gastrointestinal: no abdominal pain, no blood in stools, no constipation, no diarrhea, no melena, no nausea, no rectal pain and no vomiting.  Genitourinary: no dysuria, no change in urinary frequency, no urinary hesitancy and no feelings of urinary urgency.  Musculoskeletal: no arthralgias, no back pain and no myalgias.  Integumentary: no new skin lesions and no rashes.  Neurological: no difficulty walking, no headache, no limb weakness, no numbness and no tingling.  Psychiatric/Behavioral: no anxiety, no depression, no anhedonia and no substance use disorders.  Endocrine: no recent weight gain and no recent weight loss.  Hematologic/Lymphatic: no tendency for easy bruising and no swollen glands    Objective   Physical Exam  Patient in for annual exam of GERD history of anxiety depression thinks her son has lysed he has not noticed any thing in her her but she would like checked on.   "Well-developed well-nourished woman in no acute distress recent hysterectomy for adenomyosis had a IV thrombophlebitis on the right forearm this is resolved.  Lungs clear cardiac and abdominal exams benign no peripheral edema.  Scalp exam I see no encases nits or live lice.  She is still advised that she should treat her son and herself we talked about laundering the bed sheets and any fomite's.  Overall doing well does not need prescription refills at present.  /58 (BP Location: Left arm, Patient Position: Sitting, BP Cuff Size: Adult)   Pulse 93   Temp 36.3 °C (97.3 °F) (Temporal)   Resp 16   Ht 1.575 m (5' 2\")   Wt 58.6 kg (129 lb 3.2 oz)   SpO2 97%   BMI 23.63 kg/m²     Lab Results   Component Value Date    WBC 7.1 10/14/2023    HGB 13.6 10/14/2023    HCT 41.4 10/14/2023    MCV 85 10/14/2023     10/14/2023       Assessment/Plan plan continue current treatment course to the over-the-counter lice treatment for her and her son and follow-up in a year or as needed.  Problem List Items Addressed This Visit          Endocrine/Metabolic    BMI 23.0-23.9, adult       Gastrointestinal and Abdominal    Gastroesophageal reflux disease       Health Encounters    Routine general medical examination at a health care facility       Mental Health    Anxiety       "

## 2024-10-29 ENCOUNTER — OFFICE VISIT (OUTPATIENT)
Dept: PRIMARY CARE | Facility: CLINIC | Age: 39
End: 2024-10-29
Payer: COMMERCIAL

## 2024-10-29 VITALS
SYSTOLIC BLOOD PRESSURE: 110 MMHG | HEART RATE: 79 BPM | BODY MASS INDEX: 24.66 KG/M2 | RESPIRATION RATE: 16 BRPM | TEMPERATURE: 98.1 F | DIASTOLIC BLOOD PRESSURE: 60 MMHG | OXYGEN SATURATION: 96 % | HEIGHT: 62 IN | WEIGHT: 134 LBS

## 2024-10-29 DIAGNOSIS — J45.41 MODERATE PERSISTENT ASTHMA WITH EXACERBATION (HHS-HCC): Primary | ICD-10-CM

## 2024-10-29 PROCEDURE — 3008F BODY MASS INDEX DOCD: CPT | Performed by: NURSE PRACTITIONER

## 2024-10-29 PROCEDURE — 99213 OFFICE O/P EST LOW 20 MIN: CPT | Performed by: NURSE PRACTITIONER

## 2024-10-29 RX ORDER — PREDNISONE 10 MG/1
TABLET ORAL
Qty: 30 TABLET | Refills: 0 | Status: SHIPPED | OUTPATIENT
Start: 2024-10-29 | End: 2024-11-09

## 2024-10-29 RX ORDER — TIOTROPIUM BROMIDE INHALATION SPRAY 1.56 UG/1
2 SPRAY, METERED RESPIRATORY (INHALATION) DAILY
Qty: 8 G | Refills: 3 | Status: SHIPPED | OUTPATIENT
Start: 2024-10-29 | End: 2025-10-29

## 2024-10-29 RX ORDER — AZITHROMYCIN 250 MG/1
250 TABLET, FILM COATED ORAL DAILY
Qty: 6 TABLET | Refills: 0 | Status: SHIPPED | OUTPATIENT
Start: 2024-10-29 | End: 2024-11-03

## 2024-10-29 RX ORDER — ALBUTEROL SULFATE AND BUDESONIDE 90; 80 UG/1; UG/1
2 AEROSOL, METERED RESPIRATORY (INHALATION) EVERY 4 HOURS PRN
Qty: 5.9 G | Refills: 0 | COMMUNITY
Start: 2024-10-29

## 2024-10-29 ASSESSMENT — ENCOUNTER SYMPTOMS
TROUBLE SWALLOWING: 0
FEVER: 0
HOARSE VOICE: 1
HEADACHES: 1
COUGH: 1
WHEEZING: 1
FREQUENT THROAT CLEARING: 1
SORE THROAT: 1
CHEST TIGHTNESS: 1
SHORTNESS OF BREATH: 1

## 2024-11-01 ASSESSMENT — ENCOUNTER SYMPTOMS
DIAPHORESIS: 0
PALPITATIONS: 0
ABDOMINAL PAIN: 0
WOUND: 0
APPETITE CHANGE: 0
DIZZINESS: 0
FACIAL ASYMMETRY: 0
CONFUSION: 0
CHILLS: 0
NERVOUS/ANXIOUS: 0
FATIGUE: 0
BACK PAIN: 0
CHEST TIGHTNESS: 1
ACTIVITY CHANGE: 0
NECK PAIN: 0
UNEXPECTED WEIGHT CHANGE: 0
MYALGIAS: 0

## 2025-01-10 DIAGNOSIS — F41.9 ANXIETY: ICD-10-CM

## 2025-01-10 RX ORDER — FLUOXETINE HYDROCHLORIDE 20 MG/1
20 CAPSULE ORAL DAILY
Qty: 90 CAPSULE | Refills: 0 | Status: SHIPPED | OUTPATIENT
Start: 2025-01-10

## 2025-01-22 ENCOUNTER — APPOINTMENT (OUTPATIENT)
Dept: DERMATOLOGY | Facility: CLINIC | Age: 40
End: 2025-01-22
Payer: COMMERCIAL

## 2025-01-22 DIAGNOSIS — D22.5 MELANOCYTIC NEVI OF TRUNK: ICD-10-CM

## 2025-01-22 DIAGNOSIS — D22.72 MELANOCYTIC NEVUS OF LEFT LOWER EXTREMITY: ICD-10-CM

## 2025-01-22 DIAGNOSIS — D18.01 HEMANGIOMA OF SKIN: ICD-10-CM

## 2025-01-22 DIAGNOSIS — Z12.83 ENCOUNTER FOR SCREENING FOR MALIGNANT NEOPLASM OF SKIN: ICD-10-CM

## 2025-01-22 DIAGNOSIS — L85.3 XEROSIS CUTIS: ICD-10-CM

## 2025-01-22 DIAGNOSIS — L57.8 PHOTOAGING OF SKIN: Primary | ICD-10-CM

## 2025-01-22 DIAGNOSIS — L65.0 TELOGEN EFFLUVIUM: ICD-10-CM

## 2025-01-22 DIAGNOSIS — L82.1 SEBORRHEIC KERATOSIS: ICD-10-CM

## 2025-01-22 PROCEDURE — 1036F TOBACCO NON-USER: CPT | Performed by: DERMATOLOGY

## 2025-01-22 PROCEDURE — 99203 OFFICE O/P NEW LOW 30 MIN: CPT | Performed by: DERMATOLOGY

## 2025-01-22 ASSESSMENT — DERMATOLOGY PATIENT ASSESSMENT
ARE YOU TRYING TO GET PREGNANT: NO
DO YOU HAVE ANY NEW OR CHANGING LESIONS: YES
DO YOU USE A TANNING BED: NO
ARE YOU AN ORGAN TRANSPLANT RECIPIENT: NO
HAVE YOU HAD OR DO YOU HAVE A STAPH INFECTION: NO
DO YOU USE SUNSCREEN: OCCASIONALLY

## 2025-01-22 ASSESSMENT — PATIENT GLOBAL ASSESSMENT (PGA): PATIENT GLOBAL ASSESSMENT: PATIENT GLOBAL ASSESSMENT:  1 - CLEAR

## 2025-01-22 ASSESSMENT — DERMATOLOGY QUALITY OF LIFE (QOL) ASSESSMENT
WHAT SINGLE SKIN CONDITION LISTED BELOW IS THE PATIENT ANSWERING THE QUALITY-OF-LIFE ASSESSMENT QUESTIONS ABOUT: NONE OF THE ABOVE
RATE HOW BOTHERED YOU ARE BY EFFECTS OF YOUR SKIN PROBLEMS ON YOUR ACTIVITIES (EG, GOING OUT, ACCOMPLISHING WHAT YOU WANT, WORK ACTIVITIES OR YOUR RELATIONSHIPS WITH OTHERS): 0 - NEVER BOTHERED
ARE THERE EXCLUSIONS OR EXCEPTIONS FOR THE QUALITY OF LIFE ASSESSMENT: NO
RATE HOW BOTHERED YOU ARE BY SYMPTOMS OF YOUR SKIN PROBLEM (EG, ITCHING, STINGING BURNING, HURTING OR SKIN IRRITATION): 0 - NEVER BOTHERED
RATE HOW EMOTIONALLY BOTHERED YOU ARE BY YOUR SKIN PROBLEM (FOR EXAMPLE, WORRY, EMBARRASSMENT, FRUSTRATION): 0 - NEVER BOTHERED

## 2025-01-22 ASSESSMENT — ITCH NUMERIC RATING SCALE: HOW SEVERE IS YOUR ITCHING?: 0

## 2025-01-22 NOTE — PROGRESS NOTES
Subjective     Alta Albarran is a 39 y.o. female who presents for the following: Skin Check (Pt here for FBSE. No hx of skin cancer, does have fhx NMSC. Pt has lesion on left hand. ). Patient also reports family history of grandma with unknown skin cancer of labia majora.    Review of Systems:  No other skin or systemic complaints other than what is documented elsewhere in the note.    The following portions of the chart were reviewed this encounter and updated as appropriate:         Skin Cancer History  No skin cancer on file.      Specialty Problems    None       Objective   Well appearing patient in no apparent distress; mood and affect are within normal limits.    A full examination was performed including scalp, head, eyes, ears, nose, lips, neck, chest, axillae, abdomen, back, buttocks, bilateral upper extremities, bilateral lower extremities, hands, feet, fingers, toes, fingernails, and toenails. All findings within normal limits unless otherwise noted below.    Declined examination of genitals, does see provider for routine pelvic examinations.    Assessment/Plan   1. Photoaging of skin  Mottled pigmentation with telangiectasias and brown reticular macules in sun exposed areas of the body.     The risk of chronic, cumulative sun damage and risk of development of skin cancer was reviewed today.   The importance of sun protection was reviewed: including the use of a broad spectrum sunscreen that protects against both UVA/UVB rays, with ingredients such as Zinc oxide or titanium dioxide, wearing sun protective clothing and sun avoidance. We reviewed the warning signs of non-melanoma skin cancer and ABCDEs of melanoma  Please follow up should you notice any new or changing pre-existing skin lesion.    2. Seborrheic keratosis (2)  Generalized, Left Dorsal Hand  Brown, duarte waxy macules and stuck on appearing papules and plaques    The benign nature of these skin lesions reviewed, reassure provided and no  further treatment needed at this time.   These lesions can be removed, if symptomatic (itching, bleeding, rubbing on clothing, painful), otherwise removal is considered cosmetic.     3. Hemangioma of skin  Cherry red papules    The benign nature of these skin lesions were reviewed, no treatment is necessary.   Please follow up for any new or pre-existing lesion that is changing in size, shape, color, becomes painful, tender, itches or bleed.    4. Melanocytic nevi of trunk  Tan-brown symmetric macules and papules    Clinically benign appearing nevi, no treatment is necessary.  The importance of sun protection was reviewed: including the use of a broad spectrum sunscreen that protects against both UVA/UVB rays, with ingredients such as Zinc oxide or titanium dioxide, wearing sun protective clothing and sun avoidance.   ABCDEs of melanoma reviewed.  Please follow up should you notice any new or changing pre-existing skin lesion.    5. Melanocytic nevus of left lower extremity (3)  Left Popliteal Fossa, Left Thigh - Anterior, Right Thigh - Anterior  Scattered, uniform and benign-appearing, regular brown melanocytic papules and macules.    Clinically benign appearing nevi, no treatment is necessary.  The importance of sun protection was reviewed: including the use of a broad spectrum sunscreen that protects against both UVA/UVB rays, with ingredients such as Zinc oxide or titanium dioxide, wearing sun protective clothing and sun avoidance.   ABCDEs of melanoma reviewed.  Please follow up should you notice any new or changing pre-existing skin lesion.    6. Xerosis cutis (2)  Left Hand - Anterior, Right Hand - Anterior  Fine, ashy, pale to white scale diffusely over skin.    Dry skin is common, often worse during the dry months of the year and may also worsen as we age.  A gentle skin care regimen includes:  -washing with a mild soap without fragrance such as Dove for sensitive skin, Cetaphil or Cerave.  -pat dry after  washing and then apply a thick moisturizer such as Cerave cream or Cetaphil cream. Creams are thicker than lotions and often do a better job of restoring the skin barrier.    Can also use OTC hydrocortisone as needed for pruritus and dermatitis    7. Telogen effluvium  Scalp  Diffuse thinning of hair with some short regrowth    Telogen effluvium (TE) represents a common type of alopecia and is characterized by diffuse shedding of the hair on the scalp to varying degrees.  It occurs when hairs are shifted from the growth phase (anagen phase) to the resting phase (telogen phase).  Several factors can contribute to this including acute illnesses, trauma, surgery, changes in medications, chronic illnesses, thyroid abnormalities, anemia, etc.  Often times there is no discernable stimuli that is noted.  Hair growth eventually recycles itself and complete hair regrowth is noted, however this may take months to even years in chronic cases to occur.  Complete hair loss is never noted.    There is no effective treatment for TE and that it is a self limited disorder.    8. Encounter for screening for malignant neoplasm of skin  No suspicious lesions noted on examination today     The risk of chronic, cumulative sun damage and risk of development of skin cancer was reviewed today.   The importance of sun protection was reviewed: including the use of a broad spectrum sunscreen that protects against both UVA/UVB rays, with ingredients such as Zinc oxide or titanium dioxide, wearing sun protective clothing and sun avoidance. We reviewed the warning signs of non-melanoma skin cancer and ABCDEs of melanoma  Please follow up should you notice any new or changing pre-existing skin lesion.      Follow up in 1-2 years or sooner as needed    Jaskaran Chapman MD   PGY4, Department of Dermatology    I saw and evaluated the patient. I personally obtained the key and critical portions of the history and physical exam or was physically present for  key and critical portions performed by the resident/fellow. I reviewed the resident/fellow's documentation and discussed the patient with the resident/fellow. I agree with the resident/fellow's medical decision making as documented in the note.    Carlota Nichols, DO

## 2025-05-02 NOTE — PROGRESS NOTES
Subjective   Patient ID: Alta Albarran is a 39 y.o. female who presents for No chief complaint on file..  HPI    Patient presents in the office today for hospital follow up and asthma. Patient states ***. Ongoing X ***. Has tried ***.     Hospital follow up   HAN complete   Admitted to ***  Admission dates *** -- ***  Admitted for  ***    *** Days since discharge  Patient had ***      Patient advised to follow up with ***    Patient was prescribed ***   Review of Systems  Constitutional:  no chills, no fever and no night sweats.  Eyes: no blurred vision and no eyesight problems.  ENT: no hearing loss, no nasal congestion, no hoarseness and no sore throat.  Neck: no mass (es) and no swelling.  Cardiovascular: no chest pain, no intermittent leg claudication, no lower extremity edema, no palpitation and no syncope.  Respiratory: no cough, no shortness of breath during exertion, no shortness of breath at rest and no wheezing.  Gastrointestinal: no abdominal pain, no blood in stools, no constipation, no diarrhea, no melena, no nausea, no rectal pain and no vomiting.  Genitourinary: no dysuria, no change in urinary frequency, no urinary hesitancy and no feelings of urinary urgency.  Musculoskeletal: no arthralgias, no back pain and no myalgias.  Integumentary: no new skin lesions and no rashes.  Neurological: no difficulty walking, no headache, no limb weakness, no numbness and no tingling.  Psychiatric/Behavioral: no anxiety, no depression, no anhedonia and no substance use disorders.  Endocrine: no recent weight gain and no recent weight loss.  Hematologic/Lymphatic: no tendency for easy bruising and no swollen glands    Objective   Physical Exam    There were no vitals taken for this visit.    Lab Results   Component Value Date    WBC 7.1 10/14/2023    HGB 13.6 10/14/2023    HCT 41.4 10/14/2023    MCV 85 10/14/2023     10/14/2023       Assessment/Plan   Problem List Items Addressed This Visit     None  Visit Diagnoses         Hospital discharge follow-up          Asthma, unspecified asthma severity, unspecified whether complicated, unspecified whether persistent ([facility])

## 2025-05-06 ENCOUNTER — APPOINTMENT (OUTPATIENT)
Dept: PRIMARY CARE | Facility: CLINIC | Age: 40
End: 2025-05-06
Payer: COMMERCIAL

## 2025-05-06 VITALS
HEIGHT: 62 IN | DIASTOLIC BLOOD PRESSURE: 68 MMHG | WEIGHT: 130.8 LBS | BODY MASS INDEX: 24.07 KG/M2 | SYSTOLIC BLOOD PRESSURE: 102 MMHG | TEMPERATURE: 98.1 F | HEART RATE: 81 BPM | OXYGEN SATURATION: 97 %

## 2025-05-06 DIAGNOSIS — J45.909 ASTHMA, UNSPECIFIED ASTHMA SEVERITY, UNSPECIFIED WHETHER COMPLICATED, UNSPECIFIED WHETHER PERSISTENT (HHS-HCC): ICD-10-CM

## 2025-05-06 DIAGNOSIS — Z09 HOSPITAL DISCHARGE FOLLOW-UP: ICD-10-CM

## 2025-05-06 DIAGNOSIS — E87.6 HYPOKALEMIA: ICD-10-CM

## 2025-05-06 DIAGNOSIS — D64.9 ANEMIA, UNSPECIFIED TYPE: Primary | ICD-10-CM

## 2025-05-06 PROCEDURE — 3008F BODY MASS INDEX DOCD: CPT | Performed by: FAMILY MEDICINE

## 2025-05-06 PROCEDURE — 1036F TOBACCO NON-USER: CPT | Performed by: FAMILY MEDICINE

## 2025-05-06 PROCEDURE — 99213 OFFICE O/P EST LOW 20 MIN: CPT | Performed by: FAMILY MEDICINE

## 2025-05-06 RX ORDER — HYDROCODONE BITARTRATE AND HOMATROPINE METHYLBROMIDE ORAL SOLUTION 5; 1.5 MG/5ML; MG/5ML
5 LIQUID ORAL EVERY 6 HOURS PRN
Qty: 100 ML | Refills: 0 | Status: SHIPPED | OUTPATIENT
Start: 2025-05-06 | End: 2025-05-11

## 2025-05-06 RX ORDER — FLUTICASONE FUROATE, UMECLIDINIUM BROMIDE AND VILANTEROL TRIFENATATE 200; 62.5; 25 UG/1; UG/1; UG/1
1 POWDER RESPIRATORY (INHALATION) DAILY
Qty: 1 EACH | Refills: 3 | COMMUNITY
Start: 2025-05-06

## 2025-05-06 ASSESSMENT — PATIENT HEALTH QUESTIONNAIRE - PHQ9
2. FEELING DOWN, DEPRESSED OR HOPELESS: SEVERAL DAYS
SUM OF ALL RESPONSES TO PHQ9 QUESTIONS 1 AND 2: 2
1. LITTLE INTEREST OR PLEASURE IN DOING THINGS: SEVERAL DAYS
10. IF YOU CHECKED OFF ANY PROBLEMS, HOW DIFFICULT HAVE THESE PROBLEMS MADE IT FOR YOU TO DO YOUR WORK, TAKE CARE OF THINGS AT HOME, OR GET ALONG WITH OTHER PEOPLE: SOMEWHAT DIFFICULT

## 2025-05-08 ENCOUNTER — TELEPHONE (OUTPATIENT)
Dept: PRIMARY CARE | Facility: CLINIC | Age: 40
End: 2025-05-08
Payer: COMMERCIAL

## 2025-05-08 DIAGNOSIS — J01.00 ACUTE MAXILLARY SINUSITIS, RECURRENCE NOT SPECIFIED: Primary | ICD-10-CM

## 2025-05-08 RX ORDER — CETIRIZINE HYDROCHLORIDE AND PSEUDOEPHEDRINE HYDROCHLORIDE 5; 120 MG/1; MG/1
1 TABLET, FILM COATED, EXTENDED RELEASE ORAL 2 TIMES DAILY
COMMUNITY
Start: 2025-05-01 | End: 2025-05-08 | Stop reason: SDUPTHER

## 2025-05-08 RX ORDER — CETIRIZINE HYDROCHLORIDE AND PSEUDOEPHEDRINE HYDROCHLORIDE 5; 120 MG/1; MG/1
1 TABLET, FILM COATED, EXTENDED RELEASE ORAL 2 TIMES DAILY
Qty: 28 TABLET | Refills: 2 | Status: SHIPPED | OUTPATIENT
Start: 2025-05-08 | End: 2025-05-22

## 2025-05-08 NOTE — TELEPHONE ENCOUNTER
Pt awaree   [facility] called and spoke with the patient's wife.  See Addendum to today's appointment.

## 2025-05-08 NOTE — TELEPHONE ENCOUNTER
Mk Rodríguez MD to [facility] [id number] (Selected Message)        5/8/25  2:27 PM  I sent the prescription in but this is over-the-counter so insurance may not pay for it

## 2025-05-08 NOTE — TELEPHONE ENCOUNTER
Patient is calling because she saw you on Tuesday 5/6/25 for a hospital follow up and was given generic Zyrtec D at the ER ([facility]). She states her ear got better after taking it  and she wanted to know if she could another RX for that with a couple of refills on it. Please send it to [facility] in [place]    (Records were requested from [facility] and scanned to chart)    Patient's # [phone]    Preferred pharmacy [facility] [place]

## 2025-05-14 ENCOUNTER — TELEPHONE (OUTPATIENT)
Dept: PRIMARY CARE | Facility: CLINIC | Age: 40
End: 2025-05-14
Payer: COMMERCIAL

## 2025-05-14 LAB
ANION GAP SERPL CALCULATED.4IONS-SCNC: 9 MMOL/L (CALC) (ref 7–17)
BASOPHILS # BLD AUTO: 11 CELLS/UL (ref 0–200)
BASOPHILS NFR BLD AUTO: 0.2 %
BUN SERPL-MCNC: 15 MG/DL (ref 7–25)
BUN/CREAT SERPL: NORMAL (CALC) (ref 6–22)
CALCIUM SERPL-MCNC: 9.1 MG/DL (ref 8.6–10.2)
CHLORIDE SERPL-SCNC: 105 MMOL/L (ref 98–110)
CO2 SERPL-SCNC: 24 MMOL/L (ref 20–32)
CREAT SERPL-MCNC: 0.69 MG/DL (ref 0.5–0.99)
EGFRCR SERPLBLD CKD-EPI 2021: 112 ML/MIN/1.73M2
EOSINOPHIL # BLD AUTO: 28 CELLS/UL (ref 15–500)
EOSINOPHIL NFR BLD AUTO: 0.5 %
ERYTHROCYTE [DISTWIDTH] IN BLOOD BY AUTOMATED COUNT: 12 % (ref 11–15)
GLUCOSE SERPL-MCNC: 75 MG/DL (ref 65–139)
HCT VFR BLD AUTO: 38.2 % (ref 35–45)
HGB BLD-MCNC: 12.2 G/DL (ref 11.7–15.5)
IRON SATN MFR SERPL: 31 % (CALC) (ref 16–45)
IRON SERPL-MCNC: 93 MCG/DL (ref 40–190)
LYMPHOCYTES # BLD AUTO: 1288 CELLS/UL (ref 850–3900)
LYMPHOCYTES NFR BLD AUTO: 23 %
MCH RBC QN AUTO: 27.4 PG (ref 27–33)
MCHC RBC AUTO-ENTMCNC: 31.9 G/DL (ref 32–36)
MCV RBC AUTO: 85.8 FL (ref 80–100)
MONOCYTES # BLD AUTO: 493 CELLS/UL (ref 200–950)
MONOCYTES NFR BLD AUTO: 8.8 %
NEUTROPHILS # BLD AUTO: 3780 CELLS/UL (ref 1500–7800)
NEUTROPHILS NFR BLD AUTO: 67.5 %
PLATELET # BLD AUTO: 234 THOUSAND/UL (ref 140–400)
PMV BLD REES-ECKER: 9.5 FL (ref 7.5–12.5)
POTASSIUM SERPL-SCNC: 4.2 MMOL/L (ref 3.5–5.3)
RBC # BLD AUTO: 4.45 MILLION/UL (ref 3.8–5.1)
SODIUM SERPL-SCNC: 138 MMOL/L (ref 135–146)
TIBC SERPL-MCNC: 304 MCG/DL (CALC) (ref 250–450)
VIT B12 SERPL-MCNC: 596 PG/ML (ref 200–1100)
WBC # BLD AUTO: 5.6 THOUSAND/UL (ref 3.8–10.8)

## 2025-05-14 NOTE — TELEPHONE ENCOUNTER
----- Message from Mk Rodríguez sent at 5/14/2025  7:51 AM EDT -----  Labs are normal.  B12 and iron levels are normal also  ----- Message -----  From: Daniella Licea Results In  Sent: 5/13/2025  10:45 PM EDT  To: Mk Rodríguez MD

## 2025-07-03 DIAGNOSIS — F41.9 ANXIETY: ICD-10-CM

## 2025-07-03 RX ORDER — FLUOXETINE 20 MG/1
20 CAPSULE ORAL DAILY
Qty: 90 CAPSULE | Refills: 0 | Status: SHIPPED | OUTPATIENT
Start: 2025-07-03

## 2025-07-03 NOTE — TELEPHONE ENCOUNTER
Rx Refill Request Telephone Encounter    Name:  Alta Albarran  : 1985     FLUoxetine (PROzac) 20 mg capsule [[id number]]    Order Details    Dose: 20 mg Route: oral Frequency: Daily   Dispense Quantity: 90 capsule (90 day supply) Refills: 0    Duration: -- Dispense As Written: No          Sig: Take 1 capsule (20 mg) by mouth once daily.           ALLERGIES:   SEE LIST    Specific Pharmacy location:  [facility]    Date of last appointment:  SEE LIST  Date of next appointment:  2025    Best number to reach patient:  [phone]

## 2025-07-26 ENCOUNTER — OFFICE VISIT (OUTPATIENT)
Dept: URGENT CARE | Age: 40
End: 2025-07-26
Payer: COMMERCIAL

## 2025-07-26 VITALS
HEART RATE: 68 BPM | TEMPERATURE: 98.1 F | DIASTOLIC BLOOD PRESSURE: 70 MMHG | SYSTOLIC BLOOD PRESSURE: 106 MMHG | HEIGHT: 62 IN | WEIGHT: 135 LBS | BODY MASS INDEX: 24.84 KG/M2 | OXYGEN SATURATION: 97 % | RESPIRATION RATE: 18 BRPM

## 2025-07-26 DIAGNOSIS — S39.012A BACK STRAIN, INITIAL ENCOUNTER: Primary | ICD-10-CM

## 2025-07-26 RX ORDER — CYCLOBENZAPRINE HCL 10 MG
10 TABLET ORAL 3 TIMES DAILY PRN
Qty: 20 TABLET | Refills: 0 | Status: SHIPPED | OUTPATIENT
Start: 2025-07-26 | End: 2025-08-05

## 2025-07-26 ASSESSMENT — ENCOUNTER SYMPTOMS
BACK PAIN: 1
ARTHRALGIAS: 0
FEVER: 0
SHORTNESS OF BREATH: 0
COUGH: 0
ABDOMINAL PAIN: 0
FATIGUE: 0
WEAKNESS: 0
NAUSEA: 0
CHILLS: 0
CHEST TIGHTNESS: 0
VOMITING: 0
NUMBNESS: 0
JOINT SWELLING: 0
DIARRHEA: 0

## 2025-07-26 NOTE — PROGRESS NOTES
Subjective   Patient ID: Alta Albarran is a 40 y.o. female. They present today with a chief complaint of Back Pain (Lower back pain after moving mattress 2 weeks ago. ).    History of Present Illness  Pt presented with acute low back pain x 2 wks. Reports pain started after she moved the heavy furniture. Reports pain level was not high and just deals with it until today. She lifted heavy stuff today and her big dog rolled over her which definitely aggravated her symptoms. Reports similar episodes before and responded well to muscle relaxer. Cannot take NSAIDS or steroid. Denies weakness, numbness, tingling, loss control of bladder or BM. Denies hx of chronic back condition      Back Pain  Pertinent negatives include no abdominal pain, chest pain, fever, numbness or weakness.       Past Medical History  Allergies as of 07/26/2025 - Reviewed 07/26/2025   Allergen Reaction Noted    Cephalosporins Other, GI intolerance, GI Upset, Hives, and Nausea/vomiting 12/23/2011    Povidone-iodine Itching 07/30/2024       Prescriptions Prior to Admission[1]     Medical History[2]    Surgical History[3]     reports that she has never smoked. She has never used smokeless tobacco. She reports that she does not drink alcohol and does not use drugs.    Review of Systems  Review of Systems   Constitutional:  Negative for chills, fatigue and fever.   Respiratory:  Negative for cough, chest tightness and shortness of breath.    Cardiovascular:  Negative for chest pain.   Gastrointestinal:  Negative for abdominal pain, diarrhea, nausea and vomiting.   Musculoskeletal:  Positive for back pain. Negative for arthralgias and joint swelling.   Neurological:  Negative for weakness and numbness.                                  Objective    Vitals:    07/26/25 1542   BP: 106/70   BP Location: Right arm   Patient Position: Sitting   BP Cuff Size: Adult   Pulse: 68   Resp: 18   Temp: 36.7 °C (98.1 °F)   TempSrc: Oral   SpO2: 97%   Weight:  "61.2 kg (135 lb)   Height: 1.575 m (5' 2\")     No LMP recorded. Patient has had a hysterectomy.    Physical Exam  Constitutional:       Appearance: Normal appearance.   HENT:      Head: Normocephalic and atraumatic.      Nose: Nose normal.     Cardiovascular:      Rate and Rhythm: Normal rate and regular rhythm.      Heart sounds: No murmur heard.  Pulmonary:      Effort: Pulmonary effort is normal.      Breath sounds: Normal breath sounds.   Abdominal:      General: Abdomen is flat.      Palpations: Abdomen is soft.     Musculoskeletal:      Lumbar back: Spasms and tenderness present. No swelling, deformity, lacerations or bony tenderness. Decreased range of motion. No scoliosis.     Skin:     General: Skin is warm and dry.     Neurological:      Mental Status: She is alert and oriented to person, place, and time.     Psychiatric:         Mood and Affect: Mood normal.         Procedures    Point of Care Test & Imaging Results from this visit  No results found for this visit on 07/26/25.   Imaging  No results found.    Cardiology, Vascular, and Other Imaging  No other imaging results found for the past 2 days      Diagnostic study results (if any) were reviewed by Ginny Delgado PA-C.    Assessment/Plan   Allergies, medications, history, and pertinent labs/EKGs/Imaging reviewed by Ginny Delgado PA-C.     Medical Decision Making  Suspicious for muscle strain/sprain  Low suspicion for medical emergency condition given no red flag symptoms   Imaging test not indicated at this point based on history    Orders and Diagnoses  Diagnoses and all orders for this visit:  Back strain, initial encounter  Arthritis of lumbar spine  -     cyclobenzaprine (Flexeril) 10 mg tablet; Take 1 tablet (10 mg) by mouth 3 times a day as needed for muscle spasms for up to 10 days.      Medical Admin Record      Patient disposition: Home    Electronically signed by Ginny Delgado PA-C  4:01 PM           [1] (Not in a hospital admission)   [2]   Past " Medical History:  Diagnosis Date    Asthma     Personal history of other diseases of the digestive system     History of inflammatory bowel disease   [3]   Past Surgical History:  Procedure Laterality Date    HYSTERECTOMY  07/30/2024    ROTATOR CUFF REPAIR Left 12/17/2024

## 2025-07-26 NOTE — PATIENT INSTRUCTIONS
Take muscle relaxer as instructed, no driving after taking med  Can try OTC topical lidocaine patch, Voltaren gel  F/U with PCP for lingering or worsening of condition   ER for red flags

## 2025-09-19 ENCOUNTER — APPOINTMENT (OUTPATIENT)
Dept: PRIMARY CARE | Facility: CLINIC | Age: 40
End: 2025-09-19
Payer: COMMERCIAL